# Patient Record
Sex: MALE | Race: WHITE | NOT HISPANIC OR LATINO | Employment: FULL TIME | URBAN - METROPOLITAN AREA
[De-identification: names, ages, dates, MRNs, and addresses within clinical notes are randomized per-mention and may not be internally consistent; named-entity substitution may affect disease eponyms.]

---

## 2017-07-06 ENCOUNTER — HISTORICAL (OUTPATIENT)
Dept: SURGERY | Facility: HOSPITAL | Age: 57
End: 2017-07-06

## 2017-07-06 LAB
ABS NEUT (OLG): 5.5 X10(3)/MCL (ref 1.5–6.9)
ALBUMIN SERPL-MCNC: 3.9 GM/DL (ref 3.4–5)
ALBUMIN/GLOB SERPL: 1.1 RATIO
ALP SERPL-CCNC: 75 UNIT/L (ref 30–113)
ALT SERPL-CCNC: 35 UNIT/L (ref 10–45)
APTT PPP: 24.6 SECOND(S) (ref 25–35)
AST SERPL-CCNC: 30 UNIT/L (ref 15–37)
BASOPHILS # BLD AUTO: 0 X10(3)/MCL (ref 0–0.1)
BASOPHILS NFR BLD AUTO: 0 % (ref 0–1)
BILIRUB SERPL-MCNC: 0.4 MG/DL (ref 0.1–0.9)
BILIRUBIN DIRECT+TOT PNL SERPL-MCNC: 0.1 MG/DL (ref 0–0.3)
BILIRUBIN DIRECT+TOT PNL SERPL-MCNC: 0.3 MG/DL
BUN SERPL-MCNC: 24 MG/DL (ref 10–20)
CALCIUM SERPL-MCNC: 9.7 MG/DL (ref 8–10.5)
CHLORIDE SERPL-SCNC: 106 MMOL/L (ref 100–108)
CO2 SERPL-SCNC: 30 MMOL/L (ref 21–35)
CREAT SERPL-MCNC: 1.15 MG/DL (ref 0.7–1.3)
EOSINOPHIL # BLD AUTO: 0.2 X10(3)/MCL (ref 0–0.6)
EOSINOPHIL NFR BLD AUTO: 3 % (ref 0–5)
ERYTHROCYTE [DISTWIDTH] IN BLOOD BY AUTOMATED COUNT: 13.5 % (ref 11.5–17)
GLOBULIN SER-MCNC: 3.7 GM/DL
GLUCOSE SERPL-MCNC: 96 MG/DL (ref 75–116)
HCT VFR BLD AUTO: 45.7 % (ref 42–52)
HGB BLD-MCNC: 15.6 GM/DL (ref 14–18)
INR PPP: 0.9 (ref 0–1.2)
LYMPHOCYTES # BLD AUTO: 1.8 X10(3)/MCL (ref 0.5–4.1)
LYMPHOCYTES NFR BLD AUTO: 22.2 % (ref 15–40)
MCH RBC QN AUTO: 31 PG (ref 27–34)
MCHC RBC AUTO-ENTMCNC: 34 GM/DL (ref 31–36)
MCV RBC AUTO: 92 FL (ref 80–99)
MONOCYTES # BLD AUTO: 0.6 X10(3)/MCL (ref 0–1.1)
MONOCYTES NFR BLD AUTO: 7 % (ref 4–12)
NEUTROPHILS # BLD AUTO: 5.5 X10(3)/MCL (ref 1.5–6.9)
NEUTROPHILS NFR BLD AUTO: 68 % (ref 43–75)
PLATELET # BLD AUTO: 230 X10(3)/MCL (ref 140–400)
PMV BLD AUTO: 11.2 FL (ref 6.8–10)
POTASSIUM SERPL-SCNC: 4.3 MMOL/L (ref 3.6–5.2)
PROT SERPL-MCNC: 7.6 GM/DL (ref 6.4–8.2)
PROTHROMBIN TIME: 10 SECOND(S) (ref 9–12)
RBC # BLD AUTO: 4.97 X10(6)/MCL (ref 4.7–6.1)
SODIUM SERPL-SCNC: 143 MMOL/L (ref 135–145)
WBC # SPEC AUTO: 8.1 X10(3)/MCL (ref 4.5–11.5)

## 2017-07-07 ENCOUNTER — HISTORICAL (OUTPATIENT)
Dept: ANESTHESIOLOGY | Facility: HOSPITAL | Age: 57
End: 2017-07-07

## 2017-12-26 ENCOUNTER — HISTORICAL (OUTPATIENT)
Dept: RADIOLOGY | Facility: HOSPITAL | Age: 57
End: 2017-12-26

## 2018-02-05 ENCOUNTER — HISTORICAL (OUTPATIENT)
Dept: RADIOLOGY | Facility: HOSPITAL | Age: 58
End: 2018-02-05

## 2018-09-21 ENCOUNTER — HOSPITAL ENCOUNTER (OUTPATIENT)
Dept: MEDSURG UNIT | Facility: HOSPITAL | Age: 58
End: 2018-09-23
Attending: FAMILY MEDICINE | Admitting: FAMILY MEDICINE

## 2018-09-21 LAB
ABS NEUT (OLG): 11.8 X10(3)/MCL (ref 1.5–6.9)
ALBUMIN SERPL-MCNC: 3.9 GM/DL (ref 3.4–5)
BASOPHILS # BLD AUTO: 0 X10(3)/MCL (ref 0–0.1)
BASOPHILS NFR BLD AUTO: 0 % (ref 0–1)
BUN SERPL-MCNC: 24 MG/DL (ref 10–20)
CALCIUM SERPL-MCNC: 9.1 MG/DL (ref 8–10.5)
CHLORIDE SERPL-SCNC: 102 MMOL/L (ref 100–108)
CO2 SERPL-SCNC: 28 MMOL/L (ref 21–35)
CREAT SERPL-MCNC: 2.11 MG/DL (ref 0.7–1.3)
EOSINOPHIL # BLD AUTO: 0.1 X10(3)/MCL (ref 0–0.6)
EOSINOPHIL NFR BLD AUTO: 1 % (ref 0–5)
ERYTHROCYTE [DISTWIDTH] IN BLOOD BY AUTOMATED COUNT: 13.2 % (ref 11.5–17)
GLUCOSE SERPL-MCNC: 118 MG/DL (ref 75–116)
HCT VFR BLD AUTO: 43.2 % (ref 42–52)
HGB BLD-MCNC: 14.8 GM/DL (ref 14–18)
LYMPHOCYTES # BLD AUTO: 1.1 X10(3)/MCL (ref 0.5–4.1)
LYMPHOCYTES NFR BLD AUTO: 7.9 % (ref 15–40)
MCH RBC QN AUTO: 31 PG (ref 27–34)
MCHC RBC AUTO-ENTMCNC: 34 GM/DL (ref 31–36)
MCV RBC AUTO: 91 FL (ref 80–99)
MONOCYTES # BLD AUTO: 1.1 X10(3)/MCL (ref 0–1.1)
MONOCYTES NFR BLD AUTO: 8 % (ref 4–12)
NEUTROPHILS # BLD AUTO: 11.8 X10(3)/MCL (ref 1.5–6.9)
NEUTROPHILS NFR BLD AUTO: 84 % (ref 43–75)
PHOSPHATE SERPL-MCNC: 3.1 MG/DL (ref 2.6–4.7)
PLATELET # BLD AUTO: 230 X10(3)/MCL (ref 140–400)
PMV BLD AUTO: 11 FL (ref 6.8–10)
POTASSIUM SERPL-SCNC: 4 MMOL/L (ref 3.6–5.2)
RBC # BLD AUTO: 4.75 X10(6)/MCL (ref 4.7–6.1)
SODIUM SERPL-SCNC: 139 MMOL/L (ref 135–145)
WBC # SPEC AUTO: 14.1 X10(3)/MCL (ref 4.5–11.5)

## 2018-09-22 LAB
ABS NEUT (OLG): 8.4 X10(3)/MCL (ref 1.5–6.9)
ALBUMIN SERPL-MCNC: 3.2 GM/DL (ref 3.4–5)
ALBUMIN/GLOB SERPL: 0.9 RATIO
ALP SERPL-CCNC: 58 UNIT/L (ref 30–113)
ALT SERPL-CCNC: 23 UNIT/L (ref 10–45)
AST SERPL-CCNC: 11 UNIT/L (ref 15–37)
BASOPHILS # BLD AUTO: 0 X10(3)/MCL (ref 0–0.1)
BASOPHILS NFR BLD AUTO: 0 % (ref 0–1)
BILIRUB SERPL-MCNC: 0.8 MG/DL (ref 0.1–0.9)
BILIRUBIN DIRECT+TOT PNL SERPL-MCNC: 0.2 MG/DL (ref 0–0.3)
BILIRUBIN DIRECT+TOT PNL SERPL-MCNC: 0.6 MG/DL
BUN SERPL-MCNC: 29 MG/DL (ref 10–20)
CALCIUM SERPL-MCNC: 8.3 MG/DL (ref 8–10.5)
CHLORIDE SERPL-SCNC: 104 MMOL/L (ref 100–108)
CO2 SERPL-SCNC: 28 MMOL/L (ref 21–35)
CREAT SERPL-MCNC: 2.3 MG/DL (ref 0.7–1.3)
EOSINOPHIL # BLD AUTO: 0.1 X10(3)/MCL (ref 0–0.6)
EOSINOPHIL NFR BLD AUTO: 1 % (ref 0–5)
ERYTHROCYTE [DISTWIDTH] IN BLOOD BY AUTOMATED COUNT: 13.2 % (ref 11.5–17)
GLOBULIN SER-MCNC: 3.6 GM/DL
GLUCOSE SERPL-MCNC: 113 MG/DL (ref 75–116)
HCT VFR BLD AUTO: 39.5 % (ref 42–52)
HGB BLD-MCNC: 13.3 GM/DL (ref 14–18)
LYMPHOCYTES # BLD AUTO: 1.2 X10(3)/MCL (ref 0.5–4.1)
LYMPHOCYTES NFR BLD AUTO: 11.6 % (ref 15–40)
MAGNESIUM SERPL-MCNC: 1.9 MG/DL (ref 1.8–2.4)
MCH RBC QN AUTO: 31 PG (ref 27–34)
MCHC RBC AUTO-ENTMCNC: 34 GM/DL (ref 31–36)
MCV RBC AUTO: 92 FL (ref 80–99)
MONOCYTES # BLD AUTO: 0.8 X10(3)/MCL (ref 0–1.1)
MONOCYTES NFR BLD AUTO: 8 % (ref 4–12)
NEUTROPHILS # BLD AUTO: 8.4 X10(3)/MCL (ref 1.5–6.9)
NEUTROPHILS NFR BLD AUTO: 80 % (ref 43–75)
PHOSPHATE SERPL-MCNC: 2.3 MG/DL (ref 2.6–4.7)
PLATELET # BLD AUTO: 194 X10(3)/MCL (ref 140–400)
PMV BLD AUTO: 10.8 FL (ref 6.8–10)
POTASSIUM SERPL-SCNC: 3.8 MMOL/L (ref 3.6–5.2)
PROT SERPL-MCNC: 6.8 GM/DL (ref 6.4–8.2)
RBC # BLD AUTO: 4.28 X10(6)/MCL (ref 4.7–6.1)
SODIUM SERPL-SCNC: 140 MMOL/L (ref 135–145)
WBC # SPEC AUTO: 10.5 X10(3)/MCL (ref 4.5–11.5)

## 2018-09-23 LAB
ABS NEUT (OLG): 6.5 X10(3)/MCL (ref 1.5–6.9)
ALBUMIN SERPL-MCNC: 2.8 GM/DL (ref 3.4–5)
ALBUMIN/GLOB SERPL: 0.8 RATIO
ALP SERPL-CCNC: 52 UNIT/L (ref 30–113)
ALT SERPL-CCNC: 18 UNIT/L (ref 10–45)
AST SERPL-CCNC: 11 UNIT/L (ref 15–37)
BASOPHILS # BLD AUTO: 0 X10(3)/MCL (ref 0–0.1)
BASOPHILS NFR BLD AUTO: 0 % (ref 0–1)
BILIRUB SERPL-MCNC: 0.8 MG/DL (ref 0.1–0.9)
BILIRUBIN DIRECT+TOT PNL SERPL-MCNC: 0.3 MG/DL (ref 0–0.3)
BILIRUBIN DIRECT+TOT PNL SERPL-MCNC: 0.5 MG/DL
BUN SERPL-MCNC: 28 MG/DL (ref 10–20)
CALCIUM SERPL-MCNC: 8.1 MG/DL (ref 8–10.5)
CHLORIDE SERPL-SCNC: 104 MMOL/L (ref 100–108)
CO2 SERPL-SCNC: 26 MMOL/L (ref 21–35)
CREAT SERPL-MCNC: 2.06 MG/DL (ref 0.7–1.3)
EOSINOPHIL # BLD AUTO: 0.2 X10(3)/MCL (ref 0–0.6)
EOSINOPHIL NFR BLD AUTO: 2 % (ref 0–5)
ERYTHROCYTE [DISTWIDTH] IN BLOOD BY AUTOMATED COUNT: 12.9 % (ref 11.5–17)
GLOBULIN SER-MCNC: 3.5 GM/DL
GLUCOSE SERPL-MCNC: 101 MG/DL (ref 75–116)
HCT VFR BLD AUTO: 37.9 % (ref 42–52)
HGB BLD-MCNC: 12.8 GM/DL (ref 14–18)
LYMPHOCYTES # BLD AUTO: 1.7 X10(3)/MCL (ref 0.5–4.1)
LYMPHOCYTES NFR BLD AUTO: 17.8 % (ref 15–40)
MAGNESIUM SERPL-MCNC: 1.9 MG/DL (ref 1.8–2.4)
MCH RBC QN AUTO: 31 PG (ref 27–34)
MCHC RBC AUTO-ENTMCNC: 34 GM/DL (ref 31–36)
MCV RBC AUTO: 92 FL (ref 80–99)
MONOCYTES # BLD AUTO: 0.9 X10(3)/MCL (ref 0–1.1)
MONOCYTES NFR BLD AUTO: 10 % (ref 4–12)
NEUTROPHILS # BLD AUTO: 6.5 X10(3)/MCL (ref 1.5–6.9)
NEUTROPHILS NFR BLD AUTO: 70 % (ref 43–75)
PHOSPHATE SERPL-MCNC: 3.4 MG/DL (ref 2.6–4.7)
PLATELET # BLD AUTO: 181 X10(3)/MCL (ref 140–400)
PMV BLD AUTO: 10.7 FL (ref 6.8–10)
POTASSIUM SERPL-SCNC: 3.7 MMOL/L (ref 3.6–5.2)
PROT SERPL-MCNC: 6.3 GM/DL (ref 6.4–8.2)
RBC # BLD AUTO: 4.12 X10(6)/MCL (ref 4.7–6.1)
SODIUM SERPL-SCNC: 140 MMOL/L (ref 135–145)
WBC # SPEC AUTO: 9.4 X10(3)/MCL (ref 4.5–11.5)

## 2018-09-27 ENCOUNTER — HISTORICAL (OUTPATIENT)
Dept: LAB | Facility: HOSPITAL | Age: 58
End: 2018-09-27

## 2018-09-27 LAB
ALBUMIN SERPL-MCNC: 3.3 GM/DL (ref 3.4–5)
BUN SERPL-MCNC: 26 MG/DL (ref 10–20)
CALCIUM SERPL-MCNC: 9.4 MG/DL (ref 8–10.5)
CHLORIDE SERPL-SCNC: 104 MMOL/L (ref 100–108)
CO2 SERPL-SCNC: 28 MMOL/L (ref 21–35)
CREAT SERPL-MCNC: 1.62 MG/DL (ref 0.7–1.3)
GLUCOSE SERPL-MCNC: 95 MG/DL (ref 75–116)
PHOSPHATE SERPL-MCNC: 3.2 MG/DL (ref 2.6–4.7)
POTASSIUM SERPL-SCNC: 4 MMOL/L (ref 3.6–5.2)
SODIUM SERPL-SCNC: 142 MMOL/L (ref 135–145)

## 2018-10-31 ENCOUNTER — HISTORICAL (OUTPATIENT)
Dept: LAB | Facility: HOSPITAL | Age: 58
End: 2018-10-31

## 2018-10-31 LAB
ALBUMIN SERPL-MCNC: 3.8 GM/DL (ref 3.4–5)
ALBUMIN/GLOB SERPL: 1.2 RATIO
ALP SERPL-CCNC: 60 UNIT/L (ref 30–113)
ALT SERPL-CCNC: 28 UNIT/L (ref 10–45)
AST SERPL-CCNC: 20 UNIT/L (ref 15–37)
BILIRUB SERPL-MCNC: 0.5 MG/DL (ref 0.1–0.9)
BILIRUBIN DIRECT+TOT PNL SERPL-MCNC: 0.1 MG/DL (ref 0–0.3)
BILIRUBIN DIRECT+TOT PNL SERPL-MCNC: 0.4 MG/DL
BUN SERPL-MCNC: 20 MG/DL (ref 10–20)
CALCIUM SERPL-MCNC: 9.3 MG/DL (ref 8–10.5)
CHLORIDE SERPL-SCNC: 108 MMOL/L (ref 100–108)
CO2 SERPL-SCNC: 28 MMOL/L (ref 21–35)
CREAT SERPL-MCNC: 1.31 MG/DL (ref 0.7–1.3)
GLOBULIN SER-MCNC: 3.2 GM/DL
GLUCOSE SERPL-MCNC: 83 MG/DL (ref 75–116)
POTASSIUM SERPL-SCNC: 4 MMOL/L (ref 3.6–5.2)
PROT SERPL-MCNC: 7 GM/DL (ref 6.4–8.2)
PSA SERPL-MCNC: 1.8 NG/ML (ref 0–4)
SODIUM SERPL-SCNC: 144 MMOL/L (ref 135–145)

## 2018-12-21 ENCOUNTER — HISTORICAL (OUTPATIENT)
Dept: RADIOLOGY | Facility: HOSPITAL | Age: 58
End: 2018-12-21

## 2019-01-30 LAB
ABS NEUT (OLG): 5.9 X10(3)/MCL (ref 1.5–6.9)
ALBUMIN SERPL-MCNC: 4.1 GM/DL (ref 3.4–5)
ALBUMIN/GLOB SERPL: 1.1 RATIO
ALP SERPL-CCNC: 67 UNIT/L (ref 30–113)
ALT SERPL-CCNC: 31 UNIT/L (ref 10–45)
APTT PPP: 25.9 SECOND(S) (ref 25–35)
AST SERPL-CCNC: 20 UNIT/L (ref 15–37)
BASOPHILS # BLD AUTO: 0 X10(3)/MCL (ref 0–0.1)
BASOPHILS NFR BLD AUTO: 0 % (ref 0–1)
BILIRUB SERPL-MCNC: 0.6 MG/DL (ref 0.1–0.9)
BILIRUBIN DIRECT+TOT PNL SERPL-MCNC: 0.2 MG/DL (ref 0–0.3)
BILIRUBIN DIRECT+TOT PNL SERPL-MCNC: 0.4 MG/DL
BUN SERPL-MCNC: 20 MG/DL (ref 10–20)
CALCIUM SERPL-MCNC: 9.8 MG/DL (ref 8–10.5)
CHLORIDE SERPL-SCNC: 104 MMOL/L (ref 100–108)
CO2 SERPL-SCNC: 29 MMOL/L (ref 21–35)
CREAT SERPL-MCNC: 1.31 MG/DL (ref 0.7–1.3)
EOSINOPHIL # BLD AUTO: 0.2 X10(3)/MCL (ref 0–0.6)
EOSINOPHIL NFR BLD AUTO: 2 % (ref 0–5)
ERYTHROCYTE [DISTWIDTH] IN BLOOD BY AUTOMATED COUNT: 13.2 % (ref 11.5–17)
GLOBULIN SER-MCNC: 3.6 GM/DL
GLUCOSE SERPL-MCNC: 96 MG/DL (ref 75–116)
HCT VFR BLD AUTO: 44.1 % (ref 42–52)
HGB BLD-MCNC: 14.5 GM/DL (ref 14–18)
IMM GRANULOCYTES # BLD AUTO: 0.03 10*3/UL (ref 0–0.02)
IMM GRANULOCYTES NFR BLD AUTO: 0.3 % (ref 0–0.43)
INR PPP: 1 (ref 0–1.2)
LYMPHOCYTES # BLD AUTO: 2.4 X10(3)/MCL (ref 0.5–4.1)
LYMPHOCYTES NFR BLD AUTO: 25 % (ref 15–40)
MCH RBC QN AUTO: 30 PG (ref 27–34)
MCHC RBC AUTO-ENTMCNC: 33 GM/DL (ref 31–36)
MCV RBC AUTO: 92 FL (ref 80–99)
MONOCYTES # BLD AUTO: 0.7 X10(3)/MCL (ref 0–1.1)
MONOCYTES NFR BLD AUTO: 8 % (ref 4–12)
NEUTROPHILS # BLD AUTO: 5.9 X10(3)/MCL (ref 1.5–6.9)
NEUTROPHILS NFR BLD AUTO: 64 % (ref 43–75)
PLATELET # BLD AUTO: 248 X10(3)/MCL (ref 140–400)
PMV BLD AUTO: 10.7 FL (ref 6.8–10)
POTASSIUM SERPL-SCNC: 3.8 MMOL/L (ref 3.6–5.2)
PROT SERPL-MCNC: 7.7 GM/DL (ref 6.4–8.2)
PROTHROMBIN TIME: 9.8 SECOND(S) (ref 9–12)
RBC # BLD AUTO: 4.78 X10(6)/MCL (ref 4.7–6.1)
SODIUM SERPL-SCNC: 142 MMOL/L (ref 135–145)
WBC # SPEC AUTO: 9.3 X10(3)/MCL (ref 4.5–11.5)

## 2019-02-01 ENCOUNTER — HISTORICAL (OUTPATIENT)
Dept: ANESTHESIOLOGY | Facility: HOSPITAL | Age: 59
End: 2019-02-01

## 2019-03-19 ENCOUNTER — HISTORICAL (OUTPATIENT)
Dept: RADIOLOGY | Facility: HOSPITAL | Age: 59
End: 2019-03-19

## 2019-09-17 ENCOUNTER — HISTORICAL (OUTPATIENT)
Dept: LAB | Facility: HOSPITAL | Age: 59
End: 2019-09-17

## 2019-09-17 LAB
BUN SERPL-MCNC: 19 MG/DL (ref 10–20)
CALCIUM SERPL-MCNC: 9.3 MG/DL (ref 8–10.5)
CHLORIDE SERPL-SCNC: 106 MMOL/L (ref 100–108)
CO2 SERPL-SCNC: 31 MMOL/L (ref 21–35)
CREAT SERPL-MCNC: 1.21 MG/DL (ref 0.7–1.3)
CREAT/UREA NIT SERPL: 16
GLUCOSE SERPL-MCNC: 102 MG/DL (ref 75–116)
POTASSIUM SERPL-SCNC: 3.7 MMOL/L (ref 3.6–5.2)
PSA SERPL-MCNC: 2.15 NG/ML (ref 0–4)
SODIUM SERPL-SCNC: 144 MMOL/L (ref 135–145)

## 2020-05-20 LAB
ALBUMIN SERPL-MCNC: 4.3 GM/DL (ref 3.4–4.8)
ALBUMIN/GLOB SERPL: 1.6 RATIO (ref 1.1–2)
ALP SERPL-CCNC: 69 UNIT/L (ref 40–150)
ALT SERPL-CCNC: 30 UNIT/L (ref 0–55)
AST SERPL-CCNC: 26 UNIT/L (ref 5–34)
BILIRUB SERPL-MCNC: 0.4 MG/DL
BILIRUBIN DIRECT+TOT PNL SERPL-MCNC: 0.2 MG/DL (ref 0–0.5)
BILIRUBIN DIRECT+TOT PNL SERPL-MCNC: 0.2 MG/DL (ref 0–0.8)
BUN SERPL-MCNC: 27.7 MG/DL (ref 8.4–25.7)
CALCIUM SERPL-MCNC: 10.1 MG/DL (ref 8.8–10)
CHLORIDE SERPL-SCNC: 105 MMOL/L (ref 98–107)
CO2 SERPL-SCNC: 28 MMOL/L (ref 23–31)
CREAT SERPL-MCNC: 1.21 MG/DL (ref 0.73–1.18)
ERYTHROCYTE [DISTWIDTH] IN BLOOD BY AUTOMATED COUNT: 13.6 % (ref 11.5–17)
GLOBULIN SER-MCNC: 2.7 GM/DL (ref 2.4–3.5)
GLUCOSE SERPL-MCNC: 96 MG/DL (ref 82–115)
HCT VFR BLD AUTO: 44.2 % (ref 42–52)
HGB BLD-MCNC: 14.4 GM/DL (ref 14–18)
MCH RBC QN AUTO: 30.6 PG (ref 27–31)
MCHC RBC AUTO-ENTMCNC: 32.6 GM/DL (ref 33–36)
MCV RBC AUTO: 94 FL (ref 80–94)
PLATELET # BLD AUTO: 189 X10(3)/MCL (ref 130–400)
PMV BLD AUTO: 10.7 FL (ref 9.4–12.4)
POTASSIUM SERPL-SCNC: 4.1 MMOL/L (ref 3.5–5.1)
PROT SERPL-MCNC: 7 GM/DL (ref 5.8–7.6)
RBC # BLD AUTO: 4.7 X10(6)/MCL (ref 4.7–6.1)
SARS-COV-2 RNA RESP QL NAA+PROBE: NOT DETECTED
SODIUM SERPL-SCNC: 141 MMOL/L (ref 136–145)
WBC # SPEC AUTO: 6.2 X10(3)/MCL (ref 4.5–11.5)

## 2020-05-21 ENCOUNTER — HOSPITAL ENCOUNTER (OUTPATIENT)
Dept: MEDSURG UNIT | Facility: HOSPITAL | Age: 60
End: 2020-05-22
Attending: SURGERY | Admitting: SURGERY

## 2020-05-22 LAB
ABS NEUT (OLG): 10.94 X10(3)/MCL (ref 2.1–9.2)
ALBUMIN SERPL-MCNC: 3.4 GM/DL (ref 3.4–4.8)
ALBUMIN/GLOB SERPL: 1.3 RATIO (ref 1.1–2)
ALP SERPL-CCNC: 49 UNIT/L (ref 40–150)
ALT SERPL-CCNC: 26 UNIT/L (ref 0–55)
APTT PPP: 27.5 SECOND(S) (ref 23.2–33.7)
AST SERPL-CCNC: 21 UNIT/L (ref 5–34)
BASOPHILS # BLD AUTO: 0 X10(3)/MCL (ref 0–0.2)
BASOPHILS NFR BLD AUTO: 0 %
BILIRUB SERPL-MCNC: 0.5 MG/DL
BILIRUBIN DIRECT+TOT PNL SERPL-MCNC: 0.2 MG/DL (ref 0–0.8)
BILIRUBIN DIRECT+TOT PNL SERPL-MCNC: 0.3 MG/DL (ref 0–0.5)
BUN SERPL-MCNC: 29.7 MG/DL (ref 8.4–25.7)
CALCIUM SERPL-MCNC: 8.1 MG/DL (ref 8.8–10)
CHLORIDE SERPL-SCNC: 107 MMOL/L (ref 98–107)
CO2 SERPL-SCNC: 27 MMOL/L (ref 23–31)
CREAT SERPL-MCNC: 1.18 MG/DL (ref 0.73–1.18)
EOSINOPHIL # BLD AUTO: 0 X10(3)/MCL (ref 0–0.9)
EOSINOPHIL NFR BLD AUTO: 0 %
ERYTHROCYTE [DISTWIDTH] IN BLOOD BY AUTOMATED COUNT: 13.6 % (ref 11.5–17)
GLOBULIN SER-MCNC: 2.6 GM/DL (ref 2.4–3.5)
GLUCOSE SERPL-MCNC: 119 MG/DL (ref 82–115)
HCT VFR BLD AUTO: 42.8 % (ref 42–52)
HGB BLD-MCNC: 13.7 GM/DL (ref 14–18)
INR PPP: 1.1 (ref 0–1.3)
LYMPHOCYTES # BLD AUTO: 1.2 X10(3)/MCL (ref 0.6–4.6)
LYMPHOCYTES NFR BLD AUTO: 9 %
MAGNESIUM SERPL-MCNC: 2.18 MG/DL (ref 1.6–2.6)
MCH RBC QN AUTO: 30.1 PG (ref 27–31)
MCHC RBC AUTO-ENTMCNC: 32 GM/DL (ref 33–36)
MCV RBC AUTO: 94.1 FL (ref 80–94)
MONOCYTES # BLD AUTO: 1.1 X10(3)/MCL (ref 0.1–1.3)
MONOCYTES NFR BLD AUTO: 8 %
NEUTROPHILS # BLD AUTO: 10.94 X10(3)/MCL (ref 2.1–9.2)
NEUTROPHILS NFR BLD AUTO: 82 %
PLATELET # BLD AUTO: 190 X10(3)/MCL (ref 130–400)
PMV BLD AUTO: 11.1 FL (ref 9.4–12.4)
POTASSIUM SERPL-SCNC: 4 MMOL/L (ref 3.5–5.1)
PROT SERPL-MCNC: 6 GM/DL (ref 5.8–7.6)
PROTHROMBIN TIME: 13.5 SECOND(S) (ref 11.1–13.7)
RBC # BLD AUTO: 4.55 X10(6)/MCL (ref 4.7–6.1)
SODIUM SERPL-SCNC: 142 MMOL/L (ref 136–145)
WBC # SPEC AUTO: 13.4 X10(3)/MCL (ref 4.5–11.5)

## 2020-07-20 ENCOUNTER — HISTORICAL (OUTPATIENT)
Dept: RADIOLOGY | Facility: HOSPITAL | Age: 60
End: 2020-07-20

## 2020-10-20 LAB
ABS NEUT (OLG): 4.6 X10(3)/MCL (ref 1.5–6.9)
APTT PPP: 26.6 SEC (ref 23.4–34.9)
BUN SERPL-MCNC: 30 MG/DL (ref 8.4–25.7)
CALCIUM SERPL-MCNC: 9.3 MG/DL (ref 8.8–10)
CHLORIDE SERPL-SCNC: 106 MMOL/L (ref 98–107)
CO2 SERPL-SCNC: 26 MMOL/L (ref 23–31)
CREAT SERPL-MCNC: 1.26 MG/DL (ref 0.73–1.18)
CREAT/UREA NIT SERPL: 24
ERYTHROCYTE [DISTWIDTH] IN BLOOD BY AUTOMATED COUNT: 13.3 % (ref 11.5–17)
GLUCOSE SERPL-MCNC: 93 MG/DL (ref 82–115)
HCT VFR BLD AUTO: 41.9 % (ref 42–52)
HGB BLD-MCNC: 14.2 GM/DL (ref 14–18)
INR PPP: 1 (ref 2–3)
MCH RBC QN AUTO: 31 PG (ref 27–34)
MCHC RBC AUTO-ENTMCNC: 34 GM/DL (ref 31–36)
MCV RBC AUTO: 91 FL (ref 80–99)
PLATELET # BLD AUTO: 225 X10(3)/MCL (ref 140–400)
PMV BLD AUTO: 10.3 FL (ref 6.8–10)
POTASSIUM SERPL-SCNC: 3.8 MMOL/L (ref 3.5–5.1)
PROTHROMBIN TIME: 13.3 SEC (ref 11.7–14.5)
RBC # BLD AUTO: 4.61 X10(6)/MCL (ref 4.7–6.1)
SODIUM SERPL-SCNC: 142 MMOL/L (ref 136–145)
WBC # SPEC AUTO: 7.9 X10(3)/MCL (ref 4.5–11.5)

## 2020-10-22 ENCOUNTER — HISTORICAL (OUTPATIENT)
Dept: ANESTHESIOLOGY | Facility: HOSPITAL | Age: 60
End: 2020-10-22

## 2020-12-16 ENCOUNTER — HISTORICAL (OUTPATIENT)
Dept: LAB | Facility: HOSPITAL | Age: 60
End: 2020-12-16

## 2020-12-16 LAB — PSA SERPL-MCNC: 2.42 NG/ML

## 2021-01-27 ENCOUNTER — HISTORICAL (OUTPATIENT)
Dept: LAB | Facility: HOSPITAL | Age: 61
End: 2021-01-27

## 2021-01-27 LAB
BUN SERPL-MCNC: 30 MG/DL (ref 8.4–25.7)
CALCIUM SERPL-MCNC: 9.2 MG/DL (ref 8.8–10)
CHLORIDE SERPL-SCNC: 106 MMOL/L (ref 98–107)
CO2 SERPL-SCNC: 29 MMOL/L (ref 23–31)
CREAT SERPL-MCNC: 1.24 MG/DL (ref 0.73–1.18)
CREAT/UREA NIT SERPL: 24
GLUCOSE SERPL-MCNC: 91 MG/DL (ref 82–115)
POTASSIUM SERPL-SCNC: 3.8 MMOL/L (ref 3.5–5.1)
PSA SERPL-MCNC: 2 NG/ML
SODIUM SERPL-SCNC: 143 MMOL/L (ref 136–145)

## 2021-07-28 ENCOUNTER — HISTORICAL (OUTPATIENT)
Dept: LAB | Facility: HOSPITAL | Age: 61
End: 2021-07-28

## 2021-10-26 ENCOUNTER — HISTORICAL (OUTPATIENT)
Dept: RADIOLOGY | Facility: HOSPITAL | Age: 61
End: 2021-10-26

## 2021-10-26 LAB — POC CREATININE: 1 MG/DL (ref 0.6–1.3)

## 2021-12-30 ENCOUNTER — HISTORICAL (OUTPATIENT)
Dept: RESPIRATORY THERAPY | Facility: HOSPITAL | Age: 61
End: 2021-12-30

## 2022-01-27 ENCOUNTER — HISTORICAL (OUTPATIENT)
Dept: LAB | Facility: HOSPITAL | Age: 62
End: 2022-01-27

## 2022-01-27 LAB
BUN SERPL-MCNC: 27 MG/DL (ref 8.4–25.7)
CALCIUM SERPL-MCNC: 9.8 MG/DL (ref 8.7–10.5)
CHLORIDE SERPL-SCNC: 107 MMOL/L (ref 98–107)
CO2 SERPL-SCNC: 29 MMOL/L (ref 23–31)
CREAT SERPL-MCNC: 1.18 MG/DL (ref 0.73–1.18)
CREAT/UREA NIT SERPL: 23
GLUCOSE SERPL-MCNC: 98 MG/DL (ref 82–115)
HEMOLYSIS INTERF INDEX SERPL-ACNC: 9
ICTERIC INTERF INDEX SERPL-ACNC: 1
LIPEMIC INTERF INDEX SERPL-ACNC: 7
POTASSIUM SERPL-SCNC: 4.4 MMOL/L (ref 3.5–5.1)
PSA SERPL-MCNC: 1.09 NG/ML
SODIUM SERPL-SCNC: 143 MMOL/L (ref 136–145)

## 2022-04-30 NOTE — H&P
CHIEF COMPLAINT:  Bulge noted in the mid abdomen.    HISTORY OF PRESENT ILLNESS:  This is a 56-year-old male patient, seen and evaluated by Dr. Layo Holley, and noticed an abdominal wall hernia.  Hence, he was referred to me for repair of the same.  Patient is a known hypertensive and also has joint problems.  He has sleep apnea.  He is on multiple medications at home at present, including fenofibrate, Diovan, metoprolol, Zyrtec, aspirin, vitamins, glucosamine, etc.    PAST HISTORY:  Includes a history of hernia repair done about 20 years back.  Also had a rotator cuff surgery done.  Also had colonoscopy.    SOCIAL HISTORY:  He used to be a smoker, stopped smoking about 30 years back.  No history of alcohol abuse.  No history of any illicit drug use.    FAMILY HISTORY:  Hypertension and hypercholesterolemia present.  History of malignancy also present.    REVIEW OF SYSTEMS:  RESPIRATORY:  Patient has sleep apnea.   CARDIOVASCULAR:  Known hypertensive, known hypercholesterolemia.   NEUROLOGIC:  No seizures.   GASTROINTESTINAL:  No complaints.   HEMATOLOGIC:  No bleeding tendencies.     MUSCULOSKELETAL:  No joint pain.     GENITOURINARY:  Patient has urinary tract problems.   ENDOCRINE:  No diabetes mellitus.   PSYCHIATRIC:  No complaints.   GENERAL:  The patient has a bulge in the mid abdomen, also has diastasis of the recti.     OTHER SYSTEMS:  No complaints.    PHYSICAL EXAMINATION:  GENERAL:  General condition of the patient is satisfactory.  Moderately obese, middle-aged male.   HEENT:  No scalp lesion.  Oral cavity and throat normal.     NECK:  Supple.  No cervical or supraclavicular nodes enlarged.  Thyroid, no nodules.  Trachea central.  No carotid bruits.  Jugular venous pressure normal.   CHEST:  Air entry equal on both sides.     LUNGS:  Clear.  No rales or wheezing.     HEART:  Regular.  No murmur.  No gallop.     ABDOMEN:  Soft.  The patient has a bulge in the umbilical area.  A definite  hernia noted which is reducible.  Also has diastasis of the recti extending to the mid abdomen to the upper abdomen.  No costovertebral angle tenderness.  Good bowel sounds.  Hernia sites normal.  Genitalia normal.     EXTREMITIES:  Femoral, popliteal, and pedal pulses present.  No varicose veins.  No calf muscle tenderness.  Upper extremities normal.  Spine normal.   NEUROLOGIC:  Status is normal.    CLINICAL IMPRESSION:  Umbilical hernia, diastasis of the recti.    PLAN:  Patient is scheduled for repair of umbilical hernia.        EDI/CALE   DD: 07/07/2017 0558   DT: 07/07/2017 0629  Job # [id number]    cc: VALERIA Joyner M.D.

## 2022-04-30 NOTE — H&P
CHIEF COMPLAINT:  Bulge noted in the umbilical area.    HISTORY OF PRESENT ILLNESS:  This 58-year-old male patient, who is well known to me, reported to my office with swelling noted on the left side of the umbilical area since last several weeks.  The patient had an umbilical hernia with repair of diastasis of the rectus muscle done in 2017, he was doing well.  In recent the past, he has noticed swelling in that area and it is progressively getting worse.  He is having some discomfort from the same, hence, is requesting to have the same repaired.    PAST MEDICAL HISTORY:  The patient is a known hypertensive, known hypercholesterolemia, history of nephrolithiasis.    MEDICATIONS:  The patient is on multiple medications at home, including hydrochlorothiazide, fenofibrate, Diovan, Zyrtec, aspirin, Flomax, metoprolol, fluticasone, et cetera.    PAST SURGICAL HISTORY:  He gives a history of inguinal hernia repair several years back and had rotator cuff surgery done, had endoscopic examinations, and also as mentioned, he had an umbilical hernia repair done in 2017.    SOCIAL HISTORY:  He used to be a smoker several years back.  No use of alcohol in moderation.  No history of illicit drug use.    FAMILY HISTORY:  Mother had malignancy of the lung.  Father had malignant neoplasm of the prostate.    REVIEW OF SYSTEMS:  RESPIRATORY:  He has sleep apnea.   CARDIOVASCULAR:  Known hypertensive.  Known hypercholesterolemia.   NEUROLOGIC:  No seizures.   GASTROINTESTINAL:  No abdominal pain.   MUSCULOSKELETAL:  No joint pain.   HEMATOLOGIC:  No bleeding tendencies.     GENITOURINARY:  He has prostate problems, benign prostatic hypertrophy.  He is on Flomax.     ENDOCRINE:  No diabetes.     HEMATOLOGIC:  No bleeding tendencies.   PSYCHIATRIC:  No complaints.   GENERAL:  Patient has a bulge in the umbilical area.     OTHER SYSTEMS:  No complaints.    PHYSICAL EXAMINATION:  GENERAL:  The general condition of the patient is  satisfactory.  Moderately obese, middle-aged male.   HEENT:  No scalp lesion.  Oral cavity and throat normal.   NECK:  No cervical lymphadenopathy.  Supraclavicular nodes not enlarged.  Thyroid, no nodules.  Trachea central.  Jugular venous pressure not engorged.  No carotid bruit.     CHEST:  Lungs air entry equal on both sides.  No rales or wheezing.     HEART:  Regular.  No murmur.  No gallop.     ABDOMEN:  Soft.  Liver and spleen not enlarged.  The umbilical area has a bulge noted toward the left side, reducible, diastasis recti is still present.  No costovertebral angle tenderness.  Inguinal areas normal.  Good bowel sounds.     GENITALIA:  Normal.   EXTREMITIES:  Femoral, popliteal, and pedal pulses present.  No varicose veins.  No calf muscle tenderness.  Upper extremities, normal.     SPINE:  Normal.   NEUROLOGIC:  Status is normal.    IMPRESSION:  Recurrent umbilical hernia.    PLAN:  The patient is scheduled for repair of umbilical hernia.        EDI/CALE   DD: 02/01/2019 0508   DT: 02/01/2019 0529  Job # [id number]    cc: VALERIA Zamarripa M.D.

## 2022-04-30 NOTE — OP NOTE
PREOPERATIVE DIAGNOSIS:  Right ureteral calculus, hydronephrosis, chronic renal failure.    POSTOPERATIVE DIAGNOSES:  Right ureteral calculus, hydronephrosis, chronic renal failure.    ANESTHESIA:  IV anesthesia, LMA general anesthesia.    PROCEDURE PERFORMED:  Right ureteroscopic stone extraction with laser lithotripsy difficult and insertion of indwelling JJ stent.    DESCRIPTION OF PROCEDURE:  After operative consent, the patient was brought to the operating room and placed on the table in the supine position.  General anesthesia induced.  Patient converted to dorsal lithotomy position.  Genital area prepped and draped in usual sterile fashion.  After adequate local anesthesia with 2% plain lidocaine jelly intraurethrally, the 22-Belarusian cystoscope was inserted per urethra into the bladder under direct vision, __________ free of lesions.  The prostate was 3 fingerbreadths with trilobar obstruction.  The bladder demonstrated diffuse mild trabeculation.  No foreign bodies or tumors identified.  Right retrograde pyelogram performed, which revealed a stone in the very proximal right ureter right at the UPJ.  A guidewire was guided up in this and during the process, the stone was dislodged back into the kidney.  At this time, the intramural ureter was balloon dilated using UroMax 2 balloon dilator.  Balloon dilator was removed.  A 9-Belarusian rigid ureteroscope was advanced per urethra into the bladder and up the ureter all the way into the renal pelvis.  He had a tight area right at the UPJ.  I could see the stone had migrated up into the upper pole calyx.  We were never able to get the scope up here to grab this.  At this time, the rigid ureteroscope was removed.  The ureteral access sheath was guided over the guidewire and a flexible ureteroscope was introduced.  We went up into the upper pole and grabbed the stone and pulled it down.  However, it was too large to pass through the UPJ and we could not get the  laser fiber to thread down adjacent to the basket, despite multiple attempts.  Finally, we disengaged the stone and removed the flexible ureteroscope and the rigid ureteroscope was introduced.  This was raised up into the renal pelvis and at this time, we could identify the stone in the renal pelvis.  A grasper basket, but again the laser fiber would not thread alongside the basket.  At this time, we used a new UroMax 2 balloon dilator and went up and dilated the UPJ.  The scope was then reinserted into the bladder and the stone was engaged from previously and it was pulled down the ureter.  At this stage, the stone was disengaged from the basket and laser lithotripsy was performed fragmenting the stone in multiple fragments.  These were grasped individually in multiple passes of the ureteroscope and removed with a basket.  At the completion, the scope was advanced all the way to the renal pelvis.  No further stones were identified and no evidence of any injury to the ureter.  At this time, the ureteroscope was removed.  A 22-Bermudian cystoscope was inserted per urethra into the bladder.  Right retrograde pyelogram was performed using a 6-Bermudian Universal catheter.  This revealed no real problems and a 26 cm 6-Bermudian JJ stent was then curled in the renal pelvis and bladder, confirmed fluoroscopically and cystoscopically.  The stent was left on a suture and this was taped to the patient's penis.  Rectal examination revealed an enlarged prostate.  Testicles were bilaterally descended and within normal limits.  The penis was within normal limits.  The patient was then awakened and brought to recovery room in stable condition.  He tolerated the procedure well.    PLAN:  To followup in the office in a week.  If he is doing well later this evening, he will be able to be discharged on Norco 5 and Cipro 500 mg 1 p.o. b.i.d. for approximately 3 more days.        YEE/CALE   DD: 09/23/2018 1331   DT: 09/23/2018 1349  Job #  [id number]    cc: Dr. Layo Holley MD

## 2022-04-30 NOTE — OP NOTE
PREOPERATIVE DIAGNOSES:    1. Gross hematuria.  2. Benign prostatic hypertrophy.  3. Bladder obstruction.  4. Chronic urinary tract infection.  5. Renal cyst.  6. Renal calculus.  7. Left ureteral calculus.    POSTOPERATIVE DIAGNOSES:    1. Gross hematuria.  2. Benign prostatic hypertrophy.  3. Bladder obstruction.  4. Chronic urinary tract infection.  5. Renal cyst.  6. Renal calculus.  7. Left ureteral calculus.    PROCEDURE:    1. Cysto with right retrograde pyelogram.  2. Removal of left ureteral calculus.    DESCRIPTION OF PROCEDURE:  After operative consent, patient brought to the operating room, placed on the table in the supine position, IV sedation induced.  Patient converted to dorsal lithotomy position.  Genital area prepped and draped in the usual sterile fashion.  After adequate local anesthesia with 2% plain lidocaine jelly intraurethrally, the 22-Indonesian cystoscope was advanced per urethra into the bladder under direct vision.  Anterior urethra was free of lesions.  The prostate was 3 fingerbreadths with trilobar obstruction and elevated median lobe.  Bladder demonstrated diffuse mild trabeculation.  No foreign bodies or tumors identified.  Ureteral orifices were normal size, shape, and position.       At this time, a right retrograde pyelogram was performed which revealed normal appearing architecture with good prompt drainage.  No evidence of any hydronephrosis.  Left retrograde pyelogram, he had some dilation of the ureter and upon removal a calculus was obtained right at the ureteral orifice which was grasped with graspers and removed.  Retrograde pyelogram was then again performed on the left side.  He still had a little residual dilation.  No definite stones were visualized, and did have drainage, otherwise it was normal appearing architecture.  At this time, the bladder was drained.  Cystoscope was removed.  The patient returned to his room in good condition.    PREOPERATIVE LAB WORK:   Revealed a creatinine of 1.26.  PT, PTT were within normal limits.  White blood cell count was 7.9, hematocrit was 41.9.  His COVID-19 was negative.    PLAN:  Follow up in the office in 1 week and proceed from there.        CHB/MODL   DD: 10/22/2020 0822   DT: 10/22/2020 0936  Job # [id number]    cc: Layo Holley M.D.

## 2022-04-30 NOTE — DISCHARGE SUMMARY
HOSPITAL COURSE:  He was directly admitted to [facility] on 09/21/2018, and discharged 09/23/2018.  He presented to my office with the complaint of kidney stones and severe pain.  Overnight, he was given IV fluids, Toradol, antibiotics.  He failed to pass the stone.  He was given pain medications otherwise, and Dr. Norman was consulted.  On 09/23, Dr. Norman took him to the surgery suite and performed a removal of the right ureteral calculus, thereby relieving the hydronephrosis.  He does have some chronic renal insufficiency.  His BUN and creatinine on admit were 24 and 2.11.  There was a question of hyperkalemia on admit also.  His outpatient potassium was 8.6.  On recheck during hospitalization, it was 4.0.  With IV fluids, his BUN and creatinine came down to 28 and 2.06.  His white count on admit was 14.1 with an H and H of 14.8 and 43.2.  With IV fluids, this decreased to an H and H of 12.8 and 37.9.  His white count decreased to 9.4 with antibiotics and IV fluids.  He was allowed to recover from his surgical procedure and discharged in stable condition.    ASSESSMENT:    1. Kidney stone on the right side at the ureteropelvic junction.  2. Hydronephrosis secondary to the kidney stone.  3. Acute worsening of his chronic renal insufficiency.  4. Obstructive sleep apnea.  5. Leukocytosis.    PLAN:  Discharge patient to home.  Dr. Norman recommended AZO standard over-the-counter for urinary spasms, as the patient does have a stent placed at the UPJ.       He can take his Norco 5 one q.4 hours p.r.n. pain; aspirin 81 mg 1 p.o. q. day; Zyrtec 10 mg 1 p.o. q. day; vitamin D3 1000 units 1 p.o. q. day; Cipro 500 mg b.i.d.; Flexeril 5 mg at night p.r.n. spasm; fenofibrate 160 mg 1 p.o. q. day; Flonase as prior to hospitalization; metoprolol 50 mg q. day; Zofran 4 mg IV push we used for nausea during hospitalization (I do not think he will need this at home); Flomax 0.4 mg 1 p.o. q.h.s.; valsartan 320  mg 1 p.o. q. day.         Continue a cardiac diet at home.  Increase activity as tolerated.  Follow up with Dr. Norman as scheduled.  Follow up with me as scheduled.        NI/CALE   DD: 10/04/2018 0901   DT: 10/04/2018 0938  Job # [id number]

## 2022-04-30 NOTE — OP NOTE
PREOPERATIVE DIAGNOSIS:  Recurrent umbilical hernia.    POSTOPERATIVE DIAGNOSIS:  Recurrent umbilical hernia.    OPERATION PERFORMED:  Exploration of the umbilical area and repair of a recurrent umbilical hernia using Surgimesh.    PROCEDURE IN DETAIL:  This 58-year-old male patient was brought to the operating room, placed in supine position.  General anesthesia was given.  Abdomen was prepared and draped in usual fashion.  Patient has a moderate bulge noted lateral to the umbilicus towards the left side.  By a curved incision below the umbilicus, flaps were raised to either side.  The umbilicus was detached from the fascia.  The fascia was completely identified.  The fascial defect on the left of the umbilicus was noted.  This large hernia sac was identified.  This was excised.  The peritoneum and peritoneal contents are exposed.  The contents noted as the omentum as well as small bowel.  This was reduced back into the abdominal cavity without incident.  Following this, the peritoneum closed by 2-0 Prolene.  Following excision of hernia sac, the defect was closed in layers.  Initially, the peritoneum closed by 2-0 Vicryl interrupted sutures.  Fascia dissected, , and closed in 2 layers.  The initial layer of interrupted 2-0 Prolene sutures followed with continuous #1 Prolene sutures were placed.  Surgimesh was placed over the area, attached to the fascia with 2-0 Vicryl interrupted sutures, and the umbilicus was reattached to the fascia and Surgimesh with 2-0 Vicryl interrupted sutures.  Subcutaneous plane approximated in 2 layers with 2-0 Vicryl interrupted sutures.  Skin approximated with 3-0 nylon interrupted sutures.  Area cleaned and dressing applied.  He tolerated the procedure well.  He received a gram of Ancef prior to the procedure.  He was transferred to recovery room in good condition.        EDI/CALE   DD: 02/01/2019 2115   DT: 02/01/2019 2200  Job # [id number]    cc: Chad Vivar  M.D.

## 2022-04-30 NOTE — OP NOTE
PREOPERATIVE DIAGNOSES:    1. Umbilical hernia.   2. Diastasis of rectus muscle.    POSTOPERATIVE DIAGNOSES:    1. Umbilical hernia.   2. Diastasis of rectus muscle.    OPERATION PERFORMED:  Repair of umbilical hernia and correction of the diastasis of the rectus muscle.    ANESTHESIA:  General.    PROCEDURE IN DETAIL:  This 56-year-old male patient was brought to the operating room, placed in supine position.  General anesthesia was given.  Abdomen was prepared and draped in the usual fashion.  A vertical incision was made in the supraumbilical area.  Skin and subcutaneous tissues were incised.  Flaps were raised to either side.  The umbilical hernia sac was identified.  This was  from the adjoining tissue and opened.  Small bowel was noted without content.  Sac was excised.  Following this, the diastasis of the recti which was noted which was midline.  Opening was made in the fascia and the stretched out fascia was excised. Following this, the peritoneum closed with 0 Vicryl continuous sutures.  The suturing was extended all the way to the umbilical area and closed the defect of the umbilical area also.  Following this, the fascia was reapproximated with #1 Prolene continuous sutures.  Surgimesh was placed over the area extending from the upper most part of the diastasis all the way to the umbilical area.  The subcutaneous line tacked down to the fascia and Surgimesh.  This space was closed and pulling this to midline.  The subcutaneous plane reapproximated with 2-0 Vicryl interrupted sutures.  Skin approximated with skin clips.  Dressing applied.  He tolerated the procedure well.  He received a gram of Ancef prior to the procedure.  He was transferred to recovery room in good condition.        EDI/CALE   DD: 07/07/2017 2138   DT: 07/07/2017 2230  Job # [id number]    cc: VALERIA Zamarripa M.D.

## 2022-04-30 NOTE — CONSULTS
DATE OF CONSULTATION:  09/22/2018    CONSULTING PHYSICIAN:  Maury Norman M.D.    HISTORY:  This is a 57-year-old white male with a long history of kidney stones who has had multiple extracorporeal shockwave lithotripsies and a ureteroscopic stone extraction in the past.  He presents with sudden onset of severe right flank pain.  No fever or chills.  He denies any nausea or vomiting.  Denies any hematuria.  No difficulty voiding, has nocturia x1, a good stream.  No history of urinary tract infections.  His erections are normal.  He states he thinks his stones were calcium in the past.  He had a CT scan done in [facility] which reveals an approximately 5 mm right proximal ureteral calculus with hydronephrosis.  He also has other stones in both kidneys, the largest being approximately 5 mm in the left kidney without any obstruction.    PAST SURGERY HISTORY:  Significant for shoulder surgery, extracorporeal shockwave lithotripsy, ureteroscopy, hernia repair, a right total hip, a rotator cuff surgery, and a colonoscopy.    PAST MEDICAL HISTORY:  Positive for hypertension.    ALLERGIES:  HE HAS NO KNOWN DRUG ALLERGIES.      SOCIAL HISTORY:  Negative for tobacco or alcohol abuse.  He is  and works as an .    FAMILY HISTORY:  Positive for his dad dying at 74 from prostate cancer.  His mother had lung cancer.  He has a positive family history of renal calculus disease.  His brother also has stones.    REVIEW OF SYSTEMS:  The patient denies any fever and chills.  No recent weight loss.  No history of headaches or diplopia.  No hearing problems.  No nosebleeds.  No dysphagia.  No vision problems.  Denies any shortness of breath or cough.  Denies any chest pain or palpitations.  Denies any nausea, vomiting, diarrhea, or constipation.  He does have the right flank pain.  Musculoskeletal:  He has a history of arthritis.  He has had a right total hip.  He states he does not have any trouble walking at  this time.  No edema and feels good other than his flank pain.  He denies any urinary frequency.  No fatigue.  Denies any psychiatric problems.  Denies any neurological problems, i.e. seizures, blackout spells, paralysis.  No bleeding abnormalities.    PHYSICAL EXAMINATION:  GENERAL:  Reveals the patient to be alert, oriented, and cooperative.   HEENT:  He is normocephalic.  Eyes, ears, nose, and throat are grossly clear.     VITAL SIGNS:  He is afebrile.  His vital signs are stable.     NECK:  Supple without adenopathy.  Trachea is midline.  Carotids full and equal bilaterally.     CHEST:  Clear to auscultation and percussion.   CARDIOVASCULAR:  Reveals a regular rate and rhythm without murmur or gallop.     ABDOMEN:  Soft and nontender.  No palpable masses.   EXTREMITIES:  Demonstrated full range of motion.  There is no edema.   NEUROLOGIC:  Focally intact.   PSYCHIATRIC:  He appears to be well adjusted.   SKIN:  Without lesions.    CURRENT MEDICATIONS:  Consist of baby aspirin, Diovan, Cipro, Zofran, Flomax, metoprolol, Lasix, Toradol.  He is also on fenofibrate at home.    IMAGING:  CT scan revealed a proximal ureteral calculus with hydronephrosis.    LABORATORY DATA:  Reveals electrolytes within normal limits.  His calcium is 9.1, creatinine is 2.3.  White blood cell count is 10.5, hematocrit 39.5.    ASSESSMENT:  At this time is bilateral renal calculi with a right renal calculus with hydronephrosis.    PLAN:  For ureteroscopic stone extraction with laser lithotripsy in the a.m. as he has been eating all day today.  We will make him n.p.o. after midnight.  I have explained all the risks and benefits of the procedure.  He expresses that he understands and has no other questions.  He and his wife were both in the room, and they both agreed to proceed with surgery.  The plan will be     to schedule him for surgery in the a.m. and obtain a consent and proceed from there.  He will be made n.p.o. after  midnight.        CHB/MODL   DD: 09/22/2018 1708   DT: 09/22/2018 1746  Job # [id number]    cc: Layo Holley M.D.

## 2022-04-30 NOTE — OP NOTE
02/01/2019.   BRIEF OPERATING ROOM NOTE:  Under general anesthesia, the patient underwent exploration of the midabdomen in the umbilical area and repair of a recurrent umbilical hernia using Surgimesh.  The patient's hernia defect was lateral to the previous repair on the left side.  He tolerated the procedure well.        EDI/CALE   DD: 02/01/2019 2117   DT: 02/01/2019 2148  Job # [id number]

## 2022-08-01 ENCOUNTER — HOSPITAL ENCOUNTER (OUTPATIENT)
Dept: RADIOLOGY | Facility: HOSPITAL | Age: 62
Discharge: HOME | End: 2022-08-01
Attending: UROLOGY
Payer: COMMERCIAL

## 2022-08-01 DIAGNOSIS — N20.0 CALCULUS OF KIDNEY: ICD-10-CM

## 2022-08-01 PROCEDURE — 74018 RADEX ABDOMEN 1 VIEW: CPT | Mod: TC

## 2024-04-05 ENCOUNTER — LAB VISIT (OUTPATIENT)
Dept: LAB | Facility: HOSPITAL | Age: 64
End: 2024-04-05
Attending: UROLOGY
Payer: COMMERCIAL

## 2024-04-05 DIAGNOSIS — N28.1 ACQUIRED CYST OF KIDNEY: Primary | ICD-10-CM

## 2024-04-05 LAB
ANION GAP SERPL CALC-SCNC: 5 MEQ/L
BUN SERPL-MCNC: 25 MG/DL (ref 8.4–25.7)
CALCIUM SERPL-MCNC: 9 MG/DL (ref 8.8–10)
CHLORIDE SERPL-SCNC: 108 MMOL/L (ref 98–107)
CO2 SERPL-SCNC: 29 MMOL/L (ref 23–31)
CREAT SERPL-MCNC: 0.83 MG/DL (ref 0.73–1.18)
CREAT/UREA NIT SERPL: 30
GFR SERPLBLD CREATININE-BSD FMLA CKD-EPI: >60 MLS/MIN/1.73/M2
GLUCOSE SERPL-MCNC: 100 MG/DL (ref 82–115)
POTASSIUM SERPL-SCNC: 4 MMOL/L (ref 3.5–5.1)
PSA SERPL-MCNC: 0.79 NG/ML
SODIUM SERPL-SCNC: 142 MMOL/L (ref 136–145)

## 2024-04-05 PROCEDURE — 84153 ASSAY OF PSA TOTAL: CPT

## 2024-04-05 PROCEDURE — 36415 COLL VENOUS BLD VENIPUNCTURE: CPT

## 2024-04-05 PROCEDURE — 80048 BASIC METABOLIC PNL TOTAL CA: CPT

## 2024-08-01 DIAGNOSIS — N28.1 ACQUIRED CYST OF KIDNEY: Primary | ICD-10-CM

## 2024-08-12 DIAGNOSIS — R74.8 ELEVATED LIVER ENZYMES: Primary | ICD-10-CM

## 2024-08-13 ENCOUNTER — HOSPITAL ENCOUNTER (OUTPATIENT)
Dept: RADIOLOGY | Facility: HOSPITAL | Age: 64
Discharge: HOME | End: 2024-08-13
Attending: FAMILY MEDICINE
Payer: COMMERCIAL

## 2024-08-13 ENCOUNTER — HOSPITAL ENCOUNTER (OUTPATIENT)
Dept: RADIOLOGY | Facility: HOSPITAL | Age: 64
Discharge: HOME | End: 2024-08-13
Attending: UROLOGY
Payer: COMMERCIAL

## 2024-08-13 DIAGNOSIS — R74.8 ELEVATED LIVER ENZYMES: ICD-10-CM

## 2024-08-13 DIAGNOSIS — N28.1 ACQUIRED CYST OF KIDNEY: ICD-10-CM

## 2024-08-13 PROCEDURE — 76770 US EXAM ABDO BACK WALL COMP: CPT | Mod: TC

## 2024-08-13 PROCEDURE — 76700 US EXAM ABDOM COMPLETE: CPT | Mod: TC

## 2024-10-04 ENCOUNTER — LAB VISIT (OUTPATIENT)
Dept: LAB | Facility: HOSPITAL | Age: 64
End: 2024-10-04
Attending: UROLOGY
Payer: COMMERCIAL

## 2024-10-04 DIAGNOSIS — N28.1 ACQUIRED CYST OF KIDNEY: Primary | ICD-10-CM

## 2024-10-04 LAB
ANION GAP SERPL CALC-SCNC: 7 MEQ/L
BUN SERPL-MCNC: 21 MG/DL (ref 8.4–25.7)
CALCIUM SERPL-MCNC: 8.8 MG/DL (ref 8.8–10)
CHLORIDE SERPL-SCNC: 106 MMOL/L (ref 98–107)
CO2 SERPL-SCNC: 29 MMOL/L (ref 23–31)
CREAT SERPL-MCNC: 1.01 MG/DL (ref 0.73–1.18)
CREAT/UREA NIT SERPL: 21
GFR SERPLBLD CREATININE-BSD FMLA CKD-EPI: >60 ML/MIN/1.73/M2
GLUCOSE SERPL-MCNC: 109 MG/DL (ref 82–115)
POTASSIUM SERPL-SCNC: 3.6 MMOL/L (ref 3.5–5.1)
SODIUM SERPL-SCNC: 142 MMOL/L (ref 136–145)

## 2024-10-04 PROCEDURE — 36415 COLL VENOUS BLD VENIPUNCTURE: CPT

## 2024-10-04 PROCEDURE — 80048 BASIC METABOLIC PNL TOTAL CA: CPT

## 2025-04-22 ENCOUNTER — LAB VISIT (OUTPATIENT)
Dept: LAB | Facility: HOSPITAL | Age: 65
End: 2025-04-22
Attending: UROLOGY
Payer: MEDICARE

## 2025-04-22 DIAGNOSIS — N13.8 ENLARGED PROSTATE WITH URINARY OBSTRUCTION: Primary | ICD-10-CM

## 2025-04-22 DIAGNOSIS — Z12.5 ENCOUNTER FOR SCREENING FOR MALIGNANT NEOPLASM OF PROSTATE: ICD-10-CM

## 2025-04-22 DIAGNOSIS — N40.1 ENLARGED PROSTATE WITH URINARY OBSTRUCTION: Primary | ICD-10-CM

## 2025-04-22 LAB — PSA SERPL-MCNC: 0.88 NG/ML

## 2025-04-22 PROCEDURE — 84153 ASSAY OF PSA TOTAL: CPT

## 2025-04-22 PROCEDURE — 36415 COLL VENOUS BLD VENIPUNCTURE: CPT
